# Patient Record
Sex: FEMALE | Race: BLACK OR AFRICAN AMERICAN | Employment: UNEMPLOYED | URBAN - METROPOLITAN AREA
[De-identification: names, ages, dates, MRNs, and addresses within clinical notes are randomized per-mention and may not be internally consistent; named-entity substitution may affect disease eponyms.]

---

## 2019-06-18 ENCOUNTER — HOSPITAL ENCOUNTER (OUTPATIENT)
Dept: PHYSICAL THERAPY | Age: 44
Setting detail: THERAPIES SERIES
Discharge: HOME | End: 2019-06-18
Payer: COMMERCIAL

## 2020-07-06 ENCOUNTER — VIRTUAL VISIT (OUTPATIENT)
Dept: PULMONOLOGY | Age: 45
End: 2020-07-06
Payer: COMMERCIAL

## 2020-07-06 PROBLEM — F41.1 GAD (GENERALIZED ANXIETY DISORDER): Chronic | Status: ACTIVE | Noted: 2020-07-06

## 2020-07-06 PROBLEM — J30.9 ALLERGIC RHINITIS: Status: ACTIVE | Noted: 2020-07-06

## 2020-07-06 PROBLEM — F11.90 CHRONIC NARCOTIC USE: Chronic | Status: ACTIVE | Noted: 2020-07-06

## 2020-07-06 PROBLEM — E66.9 NON MORBID OBESITY, UNSPECIFIED OBESITY TYPE: Status: ACTIVE | Noted: 2020-07-06

## 2020-07-06 PROBLEM — E66.9 NON MORBID OBESITY, UNSPECIFIED OBESITY TYPE: Chronic | Status: ACTIVE | Noted: 2020-07-06

## 2020-07-06 PROBLEM — K21.9 GERD WITHOUT ESOPHAGITIS: Status: ACTIVE | Noted: 2020-07-06

## 2020-07-06 PROBLEM — K21.9 GERD WITHOUT ESOPHAGITIS: Chronic | Status: ACTIVE | Noted: 2020-07-06

## 2020-07-06 PROBLEM — J30.9 ALLERGIC RHINITIS: Chronic | Status: ACTIVE | Noted: 2020-07-06

## 2020-07-06 PROBLEM — E66.01 MORBID OBESITY, UNSPECIFIED OBESITY TYPE (HCC): Chronic | Status: ACTIVE | Noted: 2020-07-06

## 2020-07-06 PROCEDURE — 99244 OFF/OP CNSLTJ NEW/EST MOD 40: CPT | Performed by: INTERNAL MEDICINE

## 2020-07-06 PROCEDURE — G8427 DOCREV CUR MEDS BY ELIG CLIN: HCPCS | Performed by: INTERNAL MEDICINE

## 2020-07-06 RX ORDER — MONTELUKAST SODIUM 10 MG/1
TABLET ORAL
COMMUNITY
Start: 2020-05-20

## 2020-07-06 RX ORDER — GABAPENTIN 300 MG/1
CAPSULE ORAL
COMMUNITY
Start: 2020-06-26

## 2020-07-06 RX ORDER — METFORMIN HYDROCHLORIDE 500 MG/1
TABLET, EXTENDED RELEASE ORAL
COMMUNITY
Start: 2020-05-17

## 2020-07-06 RX ORDER — ATORVASTATIN CALCIUM 40 MG/1
TABLET, FILM COATED ORAL
COMMUNITY
Start: 2020-05-28

## 2020-07-06 ASSESSMENT — ENCOUNTER SYMPTOMS
ABDOMINAL DISTENTION: 0
VOMITING: 0
SHORTNESS OF BREATH: 0
CHOKING: 0
PHOTOPHOBIA: 0
ALLERGIC/IMMUNOLOGIC NEGATIVE: 1
EYE PAIN: 0
NAUSEA: 0
CHEST TIGHTNESS: 0
BACK PAIN: 1
ABDOMINAL PAIN: 0
APNEA: 1
RHINORRHEA: 0

## 2020-07-06 ASSESSMENT — SLEEP AND FATIGUE QUESTIONNAIRES
ESS TOTAL SCORE: 16
HOW LIKELY ARE YOU TO NOD OFF OR FALL ASLEEP WHILE SITTING AND READING: 2
HOW LIKELY ARE YOU TO NOD OFF OR FALL ASLEEP WHILE SITTING INACTIVE IN A PUBLIC PLACE: 2
HOW LIKELY ARE YOU TO NOD OFF OR FALL ASLEEP WHILE SITTING AND TALKING TO SOMEONE: 2
HOW LIKELY ARE YOU TO NOD OFF OR FALL ASLEEP WHILE LYING DOWN TO REST IN THE AFTERNOON WHEN CIRCUMSTANCES PERMIT: 2
HOW LIKELY ARE YOU TO NOD OFF OR FALL ASLEEP WHEN YOU ARE A PASSENGER IN A CAR FOR AN HOUR WITHOUT A BREAK: 2
HOW LIKELY ARE YOU TO NOD OFF OR FALL ASLEEP WHILE WATCHING TV: 2
HOW LIKELY ARE YOU TO NOD OFF OR FALL ASLEEP WHILE SITTING QUIETLY AFTER LUNCH WITHOUT ALCOHOL: 2
HOW LIKELY ARE YOU TO NOD OFF OR FALL ASLEEP IN A CAR, WHILE STOPPED FOR A FEW MINUTES IN TRAFFIC: 2

## 2020-07-06 NOTE — PROGRESS NOTES
Sofía Farrell MD, [facility], [facility]  Tiffanie Kehrt [address]  3rd Floor,  [address] [facility], [facility] Candace Pearson E [phone]   [facility] [address]  [place]. [address] [facility] [address] [phone]     Video Visit- Consult    Pursuant to the emergency declaration under the [facility], [facility] waiver authority and the [employer] Act, this Virtual  Visit was conducted, with patient's consent, to reduce the patient's risk of exposure to COVID-19. Services were provided through a video synchronous discussion virtually to substitute for in-person clinic visit. Patient was located in their home. Assessment:      Visit Diagnoses and Associated Orders     Hypersomnia   (New Problem)  -  Primary    needs work-up    POLYSOMNOGRAPHY (PSG) - Diagnostic Testing [22681 Custom]   - Future Order         Snoring   (New Problem)      needs work-up    POLYSOMNOGRAPHY (PSG) - Diagnostic Testing [[id number] Custom]   - Future Order         Essential hypertension   (Stable)           Allergic rhinitis, unspecified seasonality, unspecified trigger   (Stable)      montelukast (SINGULAIR) 10 MG tablet [[id number]]      VENTOLIN  (90 Base) MCG/ACT inhaler [89437]           Chronic narcotic use   (Stable)      gabapentin (NEURONTIN) 300 MG capsule [[id number]]           GERD without esophagitis   (Stable)           LALA (generalized anxiety disorder)   (Stable)           Morbid obesity, unspecified obesity type (HCC)   (Stable)           ORDERS WITHOUT AN ASSOCIATED DIAGNOSIS    atorvastatin (LIPITOR) 40 MG tablet [78326]      metFORMIN (GLUCOPHAGE-XR) 500 MG extended release tablet [[id number]]             Plan:      Differential diagnosis includes but not limited to: PARMINDER, PLMD's, narcolepsy, parasomnias. Reviewed PARMINDER (which is the highest likelihood diagnosis): pathophysiology, diagnosis, complications and treatment.  Instructed her not to drive if drowsy. Continue medications per her PCP and other physicians. Limit caffeine use after 3pm. Will do PSG to rule-out PARMINDER and other sleep disorders. 1 wk follow up after study to review her results. The chronic medical conditions listed are directly related to the primary diagnosis listed above. The management of the primary diagnosis affects the secondary diagnosis and vice versa. Given chronic narcotic use need to watch for central sleep apnea as well. Continue meds for: HTN, LALA, GERD, and AR. Pt would medically benefit from wt loss for PARMINDER (diet, exercise, surgical). Encouraged to quit tobacco in all forms, discussed different techniques to quit. Orders Placed This Encounter   Procedures    POLYSOMNOGRAPHY (PSG) - Diagnostic Testing          Subjective:     Patient ID: Loreta Richey is a 54 y.o. female. Chief Complaint   Patient presents with    Snoring    Daytime Sleepiness       HPI:      Loreta Richey is a 54 y.o. female referred by Dr. Theodore Dahl for a sleep evaluation. She complains of: snoring, witnessed apneas, excessive daytime sleepiness , non-restorative sleep, nocturia, waking choking/gasping, snorting awake, AM headaches, AM mouth dryness, napping, drowsiness while driving, decreased concentration, short term memory issues and tossing and turning at night. She denies: cataplexy and hypnagogic hallucinations. Symptoms began 10 years ago, gradually worsening since that time. Patient is on chronic daily narcotics for back pain and fibromyalgia. Hypertension, allergic rhinitis, gastroesophageal reflux disease, LALA, and obesity: stable on meds and followed by pt's PCP and other physicians. Previous evaluation and treatment has included- none    DOT/CDL - No  FAA/'s license -No    Previous Report(s) Reviewed: historical medical records, office notes, andreferral letter(s). Pertinent data has been documented.     [place] - Total score: 16    Caffeine Intake -  metFORMIN (GLUCOPHAGE-XR) 500 MG extended release tablet TAKE 1 TABLET BY MOUTH EVERY DAY WITH THE EVENING MEAL      Progesterone (ENDOMETRIN VA) Place vaginally      pregabalin (LYRICA) 225 MG capsule Take 225 mg by mouth 2 times daily      Prenatal Vit-Fe Fumarate-FA (PRENATAL PO) Take by mouth      Ranitidine HCl (ZANTAC PO) Take by mouth      hydrochlorothiazide (HYDRODIURIL) 25 MG tablet Take 25 mg by mouth daily      oxyCODONE-acetaminophen (PERCOCET)  MG per tablet Take 1 tablet by mouth every 4 hours as needed.  alprazolam (XANAX) 0.5 MG tablet Take 0.5 mg by mouth 3 times daily as needed. No current facility-administered medications for this visit.         Allergies as of 07/06/2020 - Review Complete 07/06/2020   Allergen Reaction Noted    Latex  10/25/2012       Patient Active Problem List   Diagnosis    Chest pain    Smoking    Essential hypertension    Impaired intestinal absorption    Iron deficiency anemia due to chronic blood loss    Chronic narcotic use    Allergic rhinitis    GERD without esophagitis    ALLA (generalized anxiety disorder)    Morbid obesity, unspecified obesity type ([address]       Past Medical History:   Diagnosis Date    ADD (attention deficit disorder)     Allergic rhinitis 7/6/2020    Anxiety     Back pain     Chronic narcotic use 7/6/2020    Degenerative disc disease     Endometriosis     Essential hypertension 5/29/2010    LALA (generalized anxiety disorder) 7/6/2020    GERD without esophagitis 7/6/2020    Hypertension     Morbid obesity, unspecified obesity type ([address] 7/6/2020       Past Surgical History:   Procedure Laterality Date    ABDOMEN SURGERY      laparoscopy dx with endometriosis    TONSILLECTOMY      TYMPANOSTOMY TUBE PLACEMENT         Family History   Problem Relation Age of Onset    Early Death Father     Diabetes Maternal Aunt     Diabetes Maternal Grandmother     High Blood Pressure Maternal Grandmother     Kidney Disease Maternal Grandmother     Arthritis Paternal Grandmother     Diabetes Paternal Grandmother     Heart Disease Paternal Grandmother     High Blood Pressure Paternal Grandmother        Review of Systems   Constitutional: Positive for fatigue. Negative for activity change and appetite change. HENT: Positive for congestion. Negative for nosebleeds, postnasal drip, rhinorrhea and sneezing. Eyes: Negative for photophobia, pain and visual disturbance. Respiratory: Positive for apnea. Negative for choking, chest tightness and shortness of breath. Cardiovascular: Negative. Gastrointestinal: Negative for abdominal distention, abdominal pain, nausea and vomiting. Endocrine: Negative for cold intolerance and heat intolerance. Genitourinary: Positive for frequency. Negative for difficulty urinating, dysuria and urgency. Musculoskeletal: Positive for back pain and myalgias. Negative for neck pain and neck stiffness. Skin: Negative. Allergic/Immunologic: Negative. Neurological: Positive for headaches. Negative for tremors, seizures, syncope and weakness. Hematological: Negative for adenopathy. Does not bruise/bleed easily. Psychiatric/Behavioral: Positive for sleep disturbance. Negative for agitation, behavioral problems and confusion. Objective:     Vitals:  Patient reported Height and Weight Calculated BMI   Patient-Reported Vitals 7/6/2020   Patient-Reported Weight 297lb   Patient-Reported Height 5 7       46.5     Due to COVID-19 this was a virtual visit and physical exam was deferred.     Electronically signed by Sd Davila MD on7/6/2020 at 10:17 AM

## 2020-07-06 NOTE — LETTER
[facility]  [address] [place] [address]  Phone: [phone]  Fax: [phone]      July 6, 2020       Patient: Chhaya Interiano   MR Number: [id number]   YOB: 1975   Date of Visit: 7/6/2020     Thank you for allowing me to participate in the care of Terry Santos. Here is my assessment and plan. Also attached is a copy of her consult note:    ASSESSMENT:  Visit Diagnoses and Associated Orders     Hypersomnia   (New Problem)  -  Primary    needs work-up    POLYSOMNOGRAPHY (PSG) - Diagnostic Testing [[id number] Custom]   - Future Order         Snoring   (New Problem)      needs work-up    POLYSOMNOGRAPHY (PSG) - Diagnostic Testing [[id number] Custom]   - Future Order         Essential hypertension   (Stable)           Allergic rhinitis, unspecified seasonality, unspecified trigger   (Stable)      montelukast (SINGULAIR) 10 MG tablet [[id number]]      VENTOLIN  (90 Base) MCG/ACT inhaler [[id number]]           Chronic narcotic use   (Stable)      gabapentin (NEURONTIN) 300 MG capsule [[id number]]           GERD without esophagitis   (Stable)           LALA (generalized anxiety disorder)   (Stable)           Morbid obesity, unspecified obesity type (HCC)   (Stable)           ORDERS WITHOUT AN ASSOCIATED DIAGNOSIS    atorvastatin (LIPITOR) 40 MG tablet [[id number]]      metFORMIN (GLUCOPHAGE-XR) 500 MG extended release tablet [[id number]]            Plan:  Differential diagnosis includes but not limited to: PARMINDER, PLMD's, narcolepsy, parasomnias. Reviewed PARMINDER (which is the highest likelihood diagnosis): pathophysiology, diagnosis, complications and treatment. Instructed her not to drive if drowsy. Continue medications per her PCP and other physicians. Limit caffeine use after 3pm. Will do PSG to rule-out PARMINDER and other sleep disorders. 1 wk follow up after study to review her results.   The chronic medical conditions listed are directly related to the primary diagnosis listed above. The management of the primary diagnosis affects the secondary diagnosis and vice versa. Given chronic narcotic use need to watch for central sleep apnea as well. Continue meds for: HTN, LALA, GERD, and AR. Pt would medically benefit from wt loss for PARMINDER (diet, exercise, surgical). Encouraged to quit tobacco in all forms, discussed different techniques to quit. Orders Placed This Encounter   Procedures    POLYSOMNOGRAPHY (PSG) - Diagnostic Testing         If you have questions or concerns, please do not hesitate to call me. I look forward to following Silvana Hammonds along with you.     Sincerely,      Justin Wallcae MD    CC providers:  Adrianna Estrada  VIA Facsimile: [address] [place] [address], [address]: [phone]

## 2020-07-30 ENCOUNTER — OFFICE VISIT (OUTPATIENT)
Dept: PRIMARY CARE CLINIC | Age: 45
End: 2020-07-30
Payer: COMMERCIAL

## 2020-07-30 PROCEDURE — G8421 BMI NOT CALCULATED: HCPCS | Performed by: NURSE PRACTITIONER

## 2020-07-30 PROCEDURE — 99211 OFF/OP EST MAY X REQ PHY/QHP: CPT | Performed by: NURSE PRACTITIONER

## 2020-07-30 PROCEDURE — G8428 CUR MEDS NOT DOCUMENT: HCPCS | Performed by: NURSE PRACTITIONER

## 2020-07-30 NOTE — PROGRESS NOTES
Estee Cantu received a viral test for COVID-19. They were educated on isolation and quarantine as appropriate. For any symptoms, they were directed to seek care from their PCP, given contact information to establish with a doctor, directed to an urgent care or the emergency room.

## 2020-07-30 NOTE — PATIENT INSTRUCTIONS

## 2020-08-01 LAB — SARS-COV-2, NAA: NOT DETECTED

## 2020-08-03 ENCOUNTER — HOSPITAL ENCOUNTER (OUTPATIENT)
Dept: SLEEP CENTER | Age: 45
Discharge: HOME | End: 2020-08-03
Payer: COMMERCIAL

## 2020-08-03 PROCEDURE — 95810 POLYSOM 6/> YRS 4/> PARAM: CPT

## 2020-08-03 PROCEDURE — 95810 POLYSOM 6/> YRS 4/> PARAM: CPT | Performed by: INTERNAL MEDICINE

## 2020-08-06 ENCOUNTER — TELEPHONE (OUTPATIENT)
Dept: PULMONOLOGY | Age: 45
End: 2020-08-06

## 2020-08-07 ENCOUNTER — TELEPHONE (OUTPATIENT)
Dept: PULMONOLOGY | Age: 45
End: 2020-08-07

## 2020-08-07 NOTE — TELEPHONE ENCOUNTER
Spoke with pt to review PSG. Pt did not have questions, stating she is familiar with cpap Message sent to Kelly Messina in sleep lab to schedule a titration study.

## 2020-08-24 ENCOUNTER — TELEPHONE (OUTPATIENT)
Dept: SLEEP CENTER | Age: 45
End: 2020-08-24

## 2020-08-24 NOTE — TELEPHONE ENCOUNTER
Called pt to f/u with sleep study scheduled for 8/25  Pt missed covid test  Asked pt to return my phone call on vm

## 2020-09-01 ENCOUNTER — TELEPHONE (OUTPATIENT)
Dept: SLEEP CENTER | Age: 45
End: 2020-09-01

## 2020-09-01 NOTE — TELEPHONE ENCOUNTER
Called pt again, missed covid test for sleep study scheduled for 9/3  Asked for a phone call back to reschedule, can prob fill with another pt if she is not getting test done offsite.

## 2020-09-03 ENCOUNTER — HOSPITAL ENCOUNTER (OUTPATIENT)
Dept: SLEEP CENTER | Age: 45
Discharge: HOME | End: 2020-09-03
Payer: COMMERCIAL